# Patient Record
Sex: FEMALE | Race: WHITE | NOT HISPANIC OR LATINO | Employment: FULL TIME | URBAN - METROPOLITAN AREA
[De-identification: names, ages, dates, MRNs, and addresses within clinical notes are randomized per-mention and may not be internally consistent; named-entity substitution may affect disease eponyms.]

---

## 2017-02-07 ENCOUNTER — HOSPITAL ENCOUNTER (OUTPATIENT)
Dept: CARDIOLOGY | Facility: HOSPITAL | Age: 47
Discharge: HOME | End: 2017-02-07
Admitting: NURSE PRACTITIONER

## 2017-02-07 ENCOUNTER — OFFICE VISIT (OUTPATIENT)
Dept: CARDIOLOGY | Facility: HOSPITAL | Age: 47
End: 2017-02-07

## 2017-02-07 ENCOUNTER — PROCEDURE VISIT (OUTPATIENT)
Dept: CARDIOLOGY | Facility: HOSPITAL | Age: 47
End: 2017-02-07

## 2017-02-07 ENCOUNTER — APPOINTMENT (OUTPATIENT)
Dept: CARDIOLOGY | Facility: HOSPITAL | Age: 47
End: 2017-02-07

## 2017-02-07 VITALS
HEART RATE: 125 BPM | OXYGEN SATURATION: 99 % | RESPIRATION RATE: 20 BRPM | TEMPERATURE: 97.6 F | DIASTOLIC BLOOD PRESSURE: 71 MMHG | SYSTOLIC BLOOD PRESSURE: 145 MMHG | BODY MASS INDEX: 34.07 KG/M2 | HEIGHT: 70 IN | WEIGHT: 238 LBS

## 2017-02-07 VITALS
SYSTOLIC BLOOD PRESSURE: 133 MMHG | DIASTOLIC BLOOD PRESSURE: 94 MMHG | WEIGHT: 238 LBS | BODY MASS INDEX: 34.07 KG/M2 | HEIGHT: 70 IN

## 2017-02-07 DIAGNOSIS — E04.9 ENLARGEMENT OF THYROID: ICD-10-CM

## 2017-02-07 DIAGNOSIS — R06.09 DYSPNEA ON EXERTION: ICD-10-CM

## 2017-02-07 DIAGNOSIS — E03.9 HYPOTHYROIDISM, UNSPECIFIED TYPE: ICD-10-CM

## 2017-02-07 DIAGNOSIS — I49.3 PVC (PREMATURE VENTRICULAR CONTRACTION): ICD-10-CM

## 2017-02-07 DIAGNOSIS — R00.2 PALPITATIONS: ICD-10-CM

## 2017-02-07 DIAGNOSIS — R00.2 PALPITATIONS: Primary | ICD-10-CM

## 2017-02-07 DIAGNOSIS — R07.89 CHEST TIGHTNESS OR PRESSURE: ICD-10-CM

## 2017-02-07 DIAGNOSIS — R00.0 TACHYCARDIA: ICD-10-CM

## 2017-02-07 LAB
ALBUMIN SERPL-MCNC: 4.6 G/DL (ref 3.2–4.8)
ALBUMIN/GLOB SERPL: 1.4 G/DL (ref 1.5–2.5)
ALP SERPL-CCNC: 97 U/L (ref 25–100)
ALT SERPL W P-5'-P-CCNC: 28 U/L (ref 7–40)
ANION GAP SERPL CALCULATED.3IONS-SCNC: 7 MMOL/L (ref 3–11)
AST SERPL-CCNC: 24 U/L (ref 0–33)
BH CV ECHO MEAS - AO ROOT AREA (BSA CORRECTED): 1.2
BH CV ECHO MEAS - AO ROOT AREA: 5.4 CM^2
BH CV ECHO MEAS - AO ROOT DIAM: 2.6 CM
BH CV ECHO MEAS - BSA(HAYCOCK): 2.3 M^2
BH CV ECHO MEAS - BSA: 2.2 M^2
BH CV ECHO MEAS - BZI_BMI: 34.1 KILOGRAMS/M^2
BH CV ECHO MEAS - BZI_METRIC_HEIGHT: 177.8 CM
BH CV ECHO MEAS - BZI_METRIC_WEIGHT: 108 KG
BH CV ECHO MEAS - CONTRAST EF (2CH): 54.8 ML/M^2
BH CV ECHO MEAS - CONTRAST EF 4CH: 53.4 ML/M^2
BH CV ECHO MEAS - EDV(CUBED): 115.7 ML
BH CV ECHO MEAS - EDV(MOD-SP2): 42 ML
BH CV ECHO MEAS - EDV(MOD-SP4): 58 ML
BH CV ECHO MEAS - EDV(TEICH): 111.4 ML
BH CV ECHO MEAS - EF(CUBED): 74.1 %
BH CV ECHO MEAS - EF(MOD-SP2): 54.8 %
BH CV ECHO MEAS - EF(TEICH): 65.8 %
BH CV ECHO MEAS - ESV(CUBED): 30 ML
BH CV ECHO MEAS - ESV(MOD-SP2): 19 ML
BH CV ECHO MEAS - ESV(MOD-SP4): 27 ML
BH CV ECHO MEAS - ESV(TEICH): 38.1 ML
BH CV ECHO MEAS - FS: 36.2 %
BH CV ECHO MEAS - IVS/LVPW: 1.2
BH CV ECHO MEAS - IVSD: 1.1 CM
BH CV ECHO MEAS - LA DIMENSION: 3.4 CM
BH CV ECHO MEAS - LA/AO: 1.3
BH CV ECHO MEAS - LAT PEAK E' VEL: 8.8 CM/SEC
BH CV ECHO MEAS - LV DIASTOLIC VOL/BSA (35-75): 25.8 ML/M^2
BH CV ECHO MEAS - LV MASS(C)D: 167.3 GRAMS
BH CV ECHO MEAS - LV MASS(C)DI: 74.4 GRAMS/M^2
BH CV ECHO MEAS - LV SYSTOLIC VOL/BSA (12-30): 12 ML/M^2
BH CV ECHO MEAS - LVIDD: 4.9 CM
BH CV ECHO MEAS - LVIDS: 3.1 CM
BH CV ECHO MEAS - LVLD AP2: 6.4 CM
BH CV ECHO MEAS - LVLD AP4: 6.7 CM
BH CV ECHO MEAS - LVLS AP2: 5.1 CM
BH CV ECHO MEAS - LVLS AP4: 5 CM
BH CV ECHO MEAS - LVPWD: 0.89 CM
BH CV ECHO MEAS - MED PEAK E' VEL: 9.09 CM/SEC
BH CV ECHO MEAS - MV A MAX VEL: 101.2 CM/SEC
BH CV ECHO MEAS - MV E MAX VEL: 83.9 CM/SEC
BH CV ECHO MEAS - MV E/A: 0.83
BH CV ECHO MEAS - RVDD: 1.9 CM
BH CV ECHO MEAS - SI(CUBED): 38.1 ML/M^2
BH CV ECHO MEAS - SI(MOD-SP2): 10.2 ML/M^2
BH CV ECHO MEAS - SI(MOD-SP4): 13.8 ML/M^2
BH CV ECHO MEAS - SI(TEICH): 32.6 ML/M^2
BH CV ECHO MEAS - SV(CUBED): 85.7 ML
BH CV ECHO MEAS - SV(MOD-SP2): 23 ML
BH CV ECHO MEAS - SV(MOD-SP4): 31 ML
BH CV ECHO MEAS - SV(TEICH): 73.2 ML
BH CV ECHO MEAS - TAPSE (>1.6): 2.4 CM2
BH CV XLRA - RV BASE: 3.7 CM
BH CV XLRA - RV LENGTH: 6 CM
BH CV XLRA - RV MID: 3.1 CM
BH CV XLRA - TDI S': 20.8 CM/SEC
BILIRUB SERPL-MCNC: 0.5 MG/DL (ref 0.3–1.2)
BNP SERPL-MCNC: 6 PG/ML (ref 0–100)
BUN BLD-MCNC: 16 MG/DL (ref 9–23)
BUN/CREAT SERPL: 20 (ref 7–25)
CALCIUM SPEC-SCNC: 10.2 MG/DL (ref 8.7–10.4)
CHLORIDE SERPL-SCNC: 102 MMOL/L (ref 99–109)
CO2 SERPL-SCNC: 31 MMOL/L (ref 20–31)
CREAT BLD-MCNC: 0.8 MG/DL (ref 0.6–1.3)
D DIMER PPP FEU-MCNC: <0.19 MG/L (FEU) (ref 0–0.5)
DEPRECATED RDW RBC AUTO: 41.9 FL (ref 37–54)
ERYTHROCYTE [DISTWIDTH] IN BLOOD BY AUTOMATED COUNT: 12.6 % (ref 11.3–14.5)
GFR SERPL CREATININE-BSD FRML MDRD: 77 ML/MIN/1.73
GLOBULIN UR ELPH-MCNC: 3.4 GM/DL
GLUCOSE BLD-MCNC: 102 MG/DL (ref 70–100)
HCT VFR BLD AUTO: 38.7 % (ref 34.5–44)
HGB BLD-MCNC: 13.3 G/DL (ref 11.5–15.5)
LEFT ATRIUM VOLUME INDEX: 10.2 ML/M2
LEFT ATRIUM VOLUME: 23 CM3
LV EF 2D ECHO EST: 70 %
MAGNESIUM SERPL-MCNC: 2.1 MG/DL (ref 1.3–2.7)
MCH RBC QN AUTO: 31.2 PG (ref 27–31)
MCHC RBC AUTO-ENTMCNC: 34.4 G/DL (ref 32–36)
MCV RBC AUTO: 90.8 FL (ref 80–99)
PLATELET # BLD AUTO: 264 10*3/MM3 (ref 150–450)
PMV BLD AUTO: 8.6 FL (ref 6–12)
POTASSIUM BLD-SCNC: 3.9 MMOL/L (ref 3.5–5.5)
PROT SERPL-MCNC: 8 G/DL (ref 5.7–8.2)
RBC # BLD AUTO: 4.26 10*6/MM3 (ref 3.89–5.14)
SODIUM BLD-SCNC: 140 MMOL/L (ref 132–146)
T4 FREE SERPL-MCNC: 1.23 NG/DL (ref 0.89–1.76)
TROPONIN I SERPL-MCNC: <0.006 NG/ML
TSH SERPL DL<=0.05 MIU/L-ACNC: 24.61 MIU/ML (ref 0.35–5.35)
WBC NRBC COR # BLD: 6.76 10*3/MM3 (ref 3.5–10.8)

## 2017-02-07 PROCEDURE — 93005 ELECTROCARDIOGRAM TRACING: CPT

## 2017-02-07 PROCEDURE — 93306 TTE W/DOPPLER COMPLETE: CPT | Performed by: INTERNAL MEDICINE

## 2017-02-07 PROCEDURE — 85027 COMPLETE CBC AUTOMATED: CPT | Performed by: NURSE PRACTITIONER

## 2017-02-07 PROCEDURE — 99215 OFFICE O/P EST HI 40 MIN: CPT | Performed by: NURSE PRACTITIONER

## 2017-02-07 PROCEDURE — 93010 ELECTROCARDIOGRAM REPORT: CPT | Performed by: INTERNAL MEDICINE

## 2017-02-07 PROCEDURE — 83735 ASSAY OF MAGNESIUM: CPT | Performed by: NURSE PRACTITIONER

## 2017-02-07 PROCEDURE — 80053 COMPREHEN METABOLIC PANEL: CPT | Performed by: NURSE PRACTITIONER

## 2017-02-07 PROCEDURE — 93306 TTE W/DOPPLER COMPLETE: CPT

## 2017-02-07 PROCEDURE — 84443 ASSAY THYROID STIM HORMONE: CPT | Performed by: NURSE PRACTITIONER

## 2017-02-07 PROCEDURE — 84439 ASSAY OF FREE THYROXINE: CPT | Performed by: NURSE PRACTITIONER

## 2017-02-07 PROCEDURE — 83880 ASSAY OF NATRIURETIC PEPTIDE: CPT | Performed by: NURSE PRACTITIONER

## 2017-02-07 PROCEDURE — 84484 ASSAY OF TROPONIN QUANT: CPT | Performed by: NURSE PRACTITIONER

## 2017-02-07 PROCEDURE — 85379 FIBRIN DEGRADATION QUANT: CPT | Performed by: NURSE PRACTITIONER

## 2017-02-07 PROCEDURE — 93270 REMOTE 30 DAY ECG REV/REPORT: CPT | Performed by: NURSE PRACTITIONER

## 2017-02-07 RX ORDER — LEVOTHYROXINE SODIUM 112 MCG
112 TABLET ORAL DAILY
COMMUNITY
Start: 2016-12-09 | End: 2017-02-07 | Stop reason: DRUGHIGH

## 2017-02-07 RX ORDER — LEVOTHYROXINE SODIUM 0.12 MG/1
125 TABLET ORAL DAILY
Qty: 30 TABLET | Refills: 0 | Status: SHIPPED | OUTPATIENT
Start: 2017-02-07 | End: 2017-04-18 | Stop reason: SDUPTHER

## 2017-02-07 RX ORDER — OMEPRAZOLE 40 MG/1
40 CAPSULE, DELAYED RELEASE ORAL DAILY
COMMUNITY
Start: 2016-11-08 | End: 2017-04-17 | Stop reason: ALTCHOICE

## 2017-02-07 NOTE — PROGRESS NOTES
[facility]  Heart and Valve Center      Encounter Date:02/07/2017     Renetta Saldana  [address]  [place] [address]  [phone]    1970    Leobardo Reina MD    Renetta Saldana is a 47 y.o. female.      Subjective:     Chief Complaint:  Establish Care (Palpitations and Diziness)       Palpitations    This is a new problem. The current episode started in the past 7 days. Episode frequency: with any exertion. The problem has been gradually worsening. Exacerbated by: standing, exertion. Associated symptoms include anxiety, chest pain (chest tightness with palpitations), diaphoresis, dizziness, an irregular heartbeat, malaise/fatigue, nausea, near-syncope, shortness of breath and weakness. Pertinent negatives include no coughing, fever, syncope or vomiting. She has tried bed rest (Oncologist stopped Anastrazol for Breast CA) for the symptoms. The treatment provided no relief. Risk factors include stress and smoking/tobacco exposure (former smoker). Breast CA with recent chemo and radiation.  Hypothyroidism.  Hx of PVC ablation      Patient with a history of breast cancer status post chemotherapy and radiation.  Last chemotherapy treatment June 2016.  She had 33 radiation treatments with her last October 2016.  She has been on hormone blocker treatment for cancer.  She is on anastrozole since January.  Stopped last week due to complaints of palpitations, tachycardia, dyspnea on exertion.  Patient has noted nervous sensation, heart rates as high as 145 bpm.  Palpitations are waking her up at night.  Associated shortness of breath, and chest pressure, dizziness, presyncope.  She has noted mild lower extremity edema improved with elevation.  Reports having a PVC ablation approximately 3 years ago with Dr. Domingo.  She does have a history of hypothyroidism.    Problem   Palpitations   Hypothyroidism       Past Surgical History   Procedure Laterality Date   • Gallbladder surgery     • Mastectomy  2015   •  Cardiac electrophysiology study and ablation  08/05/2013     PVC ablation, Dr. Jhoan Domingo   • Uterine fibroid surgery         No Known Allergies      Current Outpatient Prescriptions:   •  omeprazole (priLOSEC) 40 MG capsule, Take 40 mg by mouth Daily., Disp: , Rfl:   •  levothyroxine (SYNTHROID) 112 MCG tablet, Take 1 tablet by mouth Daily., Disp: 30 tablet, Rfl: 0    The following portions of the patient's history were reviewed and updated as appropriate: allergies, current medications, past family history, past medical history, past social history, past surgical history and problem list.    Review of Systems   Constitution: Positive for chills, diaphoresis, weakness, malaise/fatigue, night sweats and weight gain (12 lbs ). Negative for decreased appetite, fever and weight loss.   HENT: Positive for congestion and headaches. Negative for nosebleeds.    Eyes: Negative for blurred vision, visual disturbance and visual halos.   Cardiovascular: Positive for chest pain (chest tightness with palpitations), dyspnea on exertion (with palpitations), irregular heartbeat, leg swelling (mild), near-syncope, orthopnea and palpitations. Negative for claudication, cyanosis, paroxysmal nocturnal dyspnea and syncope.   Respiratory: Positive for shortness of breath and snoring. Negative for cough, hemoptysis, sleep disturbances due to breathing, sputum production and wheezing.    Endocrine: Positive for polydipsia. Negative for cold intolerance, heat intolerance, polyphagia and polyuria.   Hematologic/Lymphatic: Bruises/bleeds easily.   Skin: Positive for dry skin. Negative for itching and rash.   Musculoskeletal: Positive for joint pain. Negative for falls, joint swelling, muscle weakness and myalgias.   Gastrointestinal: Positive for bloating, abdominal pain, constipation, heartburn and nausea. Negative for diarrhea, dysphagia, melena and vomiting.   Genitourinary: Negative for dysuria, flank pain, hematuria and nocturia.  "  Neurological: Positive for dizziness, light-headedness and loss of balance. Negative for difficulty with concentration and excessive daytime sleepiness.   Psychiatric/Behavioral: Positive for depression. Negative for altered mental status. The patient is nervous/anxious.    Allergic/Immunologic: Positive for environmental allergies.       Objective:     Vitals:    02/07/17 1313 02/07/17 1314   BP: 142/75 145/71   BP Location: Left arm Left arm   Patient Position: Sitting Standing   Cuff Size: Adult    Pulse: 102 (!) 125   Resp: 20    Temp: 97.6 °F (36.4 °C)    TempSrc: Temporal Artery     SpO2: 99%    Weight: 238 lb (108 kg)    Height: 70\" (177.8 cm)          Physical Exam   Constitutional: She is oriented to person, place, and time. She appears well-developed and well-nourished. No distress.   HENT:   Head: Normocephalic and atraumatic.   Mouth/Throat: Oropharynx is clear and moist.   Eyes: Conjunctivae are normal. Pupils are equal, round, and reactive to light. No scleral icterus.   Neck: No hepatojugular reflux and no JVD present. Carotid bruit is not present. No tracheal deviation present. Thyromegaly (right enlargement noted.) present.   Cardiovascular: Normal rate, regular rhythm, normal heart sounds and intact distal pulses.  Exam reveals no friction rub.    No murmur heard.  Pulmonary/Chest: Effort normal and breath sounds normal.   Abdominal: Soft. Bowel sounds are normal. She exhibits no distension. There is no tenderness.   Musculoskeletal: She exhibits no edema.   Lymphadenopathy:     She has no cervical adenopathy.   Neurological: She is alert and oriented to person, place, and time.   Skin: Skin is warm, dry and intact. No rash noted. No cyanosis or erythema. No pallor.   Psychiatric: She has a normal mood and affect. Her behavior is normal. Thought content normal.   Vitals reviewed.      Lab and Diagnostic Review:  Results for orders placed or performed in visit on 02/07/17   Comprehensive Metabolic " Panel   Result Value Ref Range    Glucose 102 (H) 70 - 100 mg/dL    BUN 16 9 - 23 mg/dL    Creatinine 0.80 0.60 - 1.30 mg/dL    Sodium 140 132 - 146 mmol/L    Potassium 3.9 3.5 - 5.5 mmol/L    Chloride 102 99 - 109 mmol/L    CO2 31.0 20.0 - 31.0 mmol/L    Calcium 10.2 8.7 - 10.4 mg/dL    Total Protein 8.0 5.7 - 8.2 g/dL    Albumin 4.60 3.20 - 4.80 g/dL    ALT (SGPT) 28 7 - 40 U/L    AST (SGOT) 24 0 - 33 U/L    Alkaline Phosphatase 97 25 - 100 U/L    Total Bilirubin 0.5 0.3 - 1.2 mg/dL    eGFR Non African Amer 77 >60 mL/min/1.73    Globulin 3.4 gm/dL    A/G Ratio 1.4 (L) 1.5 - 2.5 g/dL    BUN/Creatinine Ratio 20.0 7.0 - 25.0    Anion Gap 7.0 3.0 - 11.0 mmol/L   CBC (No Diff)   Result Value Ref Range    WBC 6.76 3.50 - 10.80 10*3/mm3    RBC 4.26 3.89 - 5.14 10*6/mm3    Hemoglobin 13.3 11.5 - 15.5 g/dL    Hematocrit 38.7 34.5 - 44.0 %    MCV 90.8 80.0 - 99.0 fL    MCH 31.2 (H) 27.0 - 31.0 pg    MCHC 34.4 32.0 - 36.0 g/dL    RDW 12.6 11.3 - 14.5 %    RDW-SD 41.9 37.0 - 54.0 fl    MPV 8.6 6.0 - 12.0 fL    Platelets 264 150 - 450 10*3/mm3   TSH   Result Value Ref Range    TSH 24.615 (H) 0.350 - 5.350 mIU/mL   T4, Free   Result Value Ref Range    Free T4 1.23 0.89 - 1.76 ng/dL   Magnesium   Result Value Ref Range    Magnesium 2.1 1.3 - 2.7 mg/dL   BNP   Result Value Ref Range    BNP 6.0 0.0 - 100.0 pg/mL   D-dimer, Quantitative   Result Value Ref Range    D-Dimer, Quantitative <0.19 0.00 - 0.50 mg/L (FEU)   Troponin   Result Value Ref Range    Troponin I <0.006 <=0.040 ng/mL      Assessment and Plan:         1. Palpitations  Hx of PVC ablation.    - ECG 12 Lead; NSR 99 bpm    - Comprehensive Metabolic Panel (stable)  - Magnesium (normal)  - Adult Transthoracic Echo Complete; to be completed today  - Cardiac Event Monitor; 30 day.  Dr. Domingo to read)  (Discussed placing 48 hour holter, Not available today and pt would not be able to come back this week. Due to symptoms opted for 30 day event)    2. Tachycardia  May need  BB for symptoms management.    3. Dyspnea on exertion    - CBC (No Diff) (stable)  - BNP (normal)  - D-dimer, Quantitative (normal)      4. Chest tightness or pressure    - Troponin (normal)    5. Hypothyroidism, unspecified type    Elevated TSH, normal T4  Thyroid enlargement noted.  - increase levothyroxine (SYNTHROID) 125 MCG tablet; Take 1 tablet by mouth Daily.  Dispense: 30 tablet; Refill: 0    F/u with PCP this week for possible Thyroid US and or endocrinology referral.    F/u pending results.     *Please note that portions of this note were completed with a voice recognition program. Efforts were made to edit the dictations, but occasionally words are mistranscribed.

## 2017-02-21 DIAGNOSIS — R00.2 PALPITATIONS: Primary | ICD-10-CM

## 2017-02-21 DIAGNOSIS — R00.0 SINUS TACHYCARDIA: ICD-10-CM

## 2017-02-21 RX ORDER — METOPROLOL SUCCINATE 25 MG/1
25 TABLET, EXTENDED RELEASE ORAL DAILY
Qty: 30 TABLET | Refills: 3 | Status: SHIPPED | OUTPATIENT
Start: 2017-02-21 | End: 2017-04-17 | Stop reason: SDUPTHER

## 2017-03-09 DIAGNOSIS — E03.9 HYPOTHYROIDISM, UNSPECIFIED TYPE: ICD-10-CM

## 2017-03-10 RX ORDER — LEVOTHYROXINE SODIUM 125 MCG
TABLET ORAL
Qty: 30 TABLET | Refills: 0 | OUTPATIENT
Start: 2017-03-10

## 2017-04-17 ENCOUNTER — LAB (OUTPATIENT)
Dept: LAB | Facility: HOSPITAL | Age: 47
End: 2017-04-17

## 2017-04-17 ENCOUNTER — HOSPITAL ENCOUNTER (OUTPATIENT)
Dept: GENERAL RADIOLOGY | Facility: HOSPITAL | Age: 47
Discharge: HOME | End: 2017-04-17
Admitting: NURSE PRACTITIONER

## 2017-04-17 ENCOUNTER — OFFICE VISIT (OUTPATIENT)
Dept: CARDIOLOGY | Facility: HOSPITAL | Age: 47
End: 2017-04-17

## 2017-04-17 VITALS
RESPIRATION RATE: 18 BRPM | HEART RATE: 122 BPM | OXYGEN SATURATION: 97 % | WEIGHT: 250.6 LBS | BODY MASS INDEX: 35.88 KG/M2 | HEIGHT: 70 IN | TEMPERATURE: 98.3 F | DIASTOLIC BLOOD PRESSURE: 76 MMHG | SYSTOLIC BLOOD PRESSURE: 144 MMHG

## 2017-04-17 DIAGNOSIS — E03.9 HYPOTHYROIDISM, UNSPECIFIED TYPE: Primary | ICD-10-CM

## 2017-04-17 DIAGNOSIS — E04.9 ENLARGEMENT OF THYROID: ICD-10-CM

## 2017-04-17 DIAGNOSIS — E03.9 HYPOTHYROIDISM, UNSPECIFIED TYPE: ICD-10-CM

## 2017-04-17 DIAGNOSIS — R00.2 PALPITATIONS: ICD-10-CM

## 2017-04-17 DIAGNOSIS — R00.0 SINUS TACHYCARDIA: ICD-10-CM

## 2017-04-17 DIAGNOSIS — I49.3 PVC (PREMATURE VENTRICULAR CONTRACTION): ICD-10-CM

## 2017-04-17 DIAGNOSIS — R06.09 DYSPNEA ON EXERTION: ICD-10-CM

## 2017-04-17 LAB
DEPRECATED FTI SERPL-MCNC: 2.1 TBI
T3RU NFR SERPL: 39.8 % (ref 23–37)
T4 SERPL-MCNC: 5.2 MCG/DL (ref 4.7–11.4)
TSH SERPL DL<=0.05 MIU/L-ACNC: 0.98 MIU/ML (ref 0.35–5.35)

## 2017-04-17 PROCEDURE — 71020 HC CHEST PA AND LATERAL: CPT

## 2017-04-17 PROCEDURE — 84436 ASSAY OF TOTAL THYROXINE: CPT

## 2017-04-17 PROCEDURE — 84443 ASSAY THYROID STIM HORMONE: CPT

## 2017-04-17 PROCEDURE — 99213 OFFICE O/P EST LOW 20 MIN: CPT | Performed by: NURSE PRACTITIONER

## 2017-04-17 PROCEDURE — 84479 ASSAY OF THYROID (T3 OR T4): CPT

## 2017-04-17 RX ORDER — MELATONIN
5000 DAILY
COMMUNITY

## 2017-04-17 RX ORDER — LANSOPRAZOLE 30 MG/1
60 CAPSULE, DELAYED RELEASE ORAL DAILY
COMMUNITY

## 2017-04-17 RX ORDER — METOPROLOL SUCCINATE 25 MG/1
25 TABLET, EXTENDED RELEASE ORAL 2 TIMES DAILY
Qty: 60 TABLET | Refills: 3 | Status: SHIPPED | OUTPATIENT
Start: 2017-04-17 | End: 2017-04-17 | Stop reason: SDUPTHER

## 2017-04-17 RX ORDER — METOPROLOL SUCCINATE 25 MG/1
25 TABLET, EXTENDED RELEASE ORAL 2 TIMES DAILY
Qty: 180 TABLET | Refills: 3 | Status: SHIPPED | OUTPATIENT
Start: 2017-04-17

## 2017-04-17 NOTE — PROGRESS NOTES
[facility]  Heart and Valve Center      Encounter Date:04/17/2017     Renetta Saldana  [address] [place] [address]  [phone]    1970    Leobardo Reina MD    Renetta Saldana is a 47 y.o. female.      Subjective:     Chief Complaint:  Follow-up (palpitations, dyspnea)       HPI     Patient was last seen in the heart and valve Center on 2/7/2017 with complaints of palpitations.  Worse with exertion and changing positions.  Associated symptoms included anxiety, dizziness, diaphoresis, fatigue.  Patient was placed on beta blocker, TSH was found to be elevated with adjustment of Synthroid, echocardiogram was completed.  With normal EF and no significant valvular abnormalities.  She was placed on a cardiac event monitor which showed sinus rhythm and sinus tachycardia.  Symptoms that were in the setting of breast cancer treatment.  Patient presents today for follow-up.    Denies chest pain, pressure.  Palpitations have continued but improved.  Dyspnea is still noted with exertion.  Patient reports a 10-12 pound weight gain.  She has not had her thyroid studies repeated.  States she was ordered a chest x-ray by her primary care provider which has not been completed.  She denies syncope, dizziness, edema.  She is in the process of finding a new oncologist due to insurance change.  She complains of extremely dry skin, weight gain as mentioned above, severe fatigue.    Patient Active Problem List    Diagnosis   • Palpitations [R00.2]   • Enlargement of thyroid [E04.9]   • PVC (premature ventricular contraction) [I49.3]     Overview Note:     · Echocardiogram 7/29/2009: Normal valve structures, EF 60-65%  · Normal stress test 8/12/2009, thallium GXT.  No ischemia, inferior lateral ST depression during stress noted  · History of not being able to tolerate beta blocker due to fatigue  · PVC ablation 8/5/2013, Jhoan Domingo     • Hypothyroidism [E03.9]         Past Surgical History:   Procedure Laterality  Date   • CARDIAC ELECTROPHYSIOLOGY STUDY AND ABLATION  08/05/2013    PVC ablation, Dr. Jhoan Domingo   • GALLBLADDER SURGERY     • MASTECTOMY  2015   • UTERINE FIBROID SURGERY         No Known Allergies      Current Outpatient Prescriptions:   •  cholecalciferol (VITAMIN D3) 1000 UNITS tablet, Take 2,000 Units by mouth Every Other Day., Disp: , Rfl:   •  lansoprazole (PREVACID) 30 MG capsule, Take 30 mg by mouth Daily., Disp: , Rfl:   •  levothyroxine (SYNTHROID) 125 MCG tablet, Take 1 tablet by mouth Daily., Disp: 30 tablet, Rfl: 0  •  metoprolol succinate XL (TOPROL-XL) 25 MG 24 hr tablet, Take 1 tablet by mouth 2 (Two) Times a Day., Disp: 180 tablet, Rfl: 3    The following portions of the patient's history were reviewed and updated as appropriate: allergies, current medications, past family history, past medical history, past social history, past surgical history and problem list.    Review of Systems   Constitution: Positive for weakness, malaise/fatigue, night sweats and weight gain. Negative for chills, decreased appetite, diaphoresis, fever and weight loss.   HENT: Positive for headaches. Negative for congestion and nosebleeds.    Eyes: Negative.  Negative for blurred vision, visual disturbance and visual halos.   Cardiovascular: Positive for dyspnea on exertion, irregular heartbeat, leg swelling and palpitations. Negative for chest pain, claudication, cyanosis, near-syncope, orthopnea, paroxysmal nocturnal dyspnea and syncope.   Respiratory: Positive for snoring. Negative for cough, hemoptysis, shortness of breath, sleep disturbances due to breathing, sputum production and wheezing.    Endocrine: Negative for cold intolerance, heat intolerance, polydipsia, polyphagia and polyuria.   Hematologic/Lymphatic: Bruises/bleeds easily.   Skin: Positive for dry skin. Negative for itching and rash.   Musculoskeletal: Positive for joint pain, joint swelling and myalgias. Negative for falls and muscle weakness.  "  Gastrointestinal: Positive for bloating, abdominal pain, diarrhea and heartburn. Negative for constipation, dysphagia, melena, nausea and vomiting.   Genitourinary: Positive for nocturia. Negative for dysuria, flank pain and hematuria.   Neurological: Positive for difficulty with concentration and excessive daytime sleepiness. Negative for dizziness and loss of balance.   Psychiatric/Behavioral: Positive for depression. Negative for altered mental status. The patient is not nervous/anxious.    Allergic/Immunologic: Positive for environmental allergies.       Objective:     Vitals:    04/17/17 1420 04/17/17 1421   BP: 141/86 144/76   BP Location: Left arm Left arm   Patient Position: Sitting Standing   Pulse: 107 (!) 122   Resp: 18    Temp: 98.3 °F (36.8 °C)    TempSrc: Temporal Artery     SpO2: 97%    Weight: 250 lb 9.6 oz (114 kg)    Height: 70\" (177.8 cm)          Physical Exam   Constitutional: She is oriented to person, place, and time. She appears well-developed and well-nourished. No distress.   HENT:   Head: Normocephalic and atraumatic.   Mouth/Throat: Oropharynx is clear and moist.   Eyes: Conjunctivae are normal. Pupils are equal, round, and reactive to light. No scleral icterus.   Neck: No hepatojugular reflux and no JVD present. Carotid bruit is not present. No tracheal deviation present. No thyromegaly present.   Cardiovascular: Regular rhythm, normal heart sounds and intact distal pulses.  Tachycardia present.  Exam reveals no friction rub.    No murmur heard.  Pulmonary/Chest: Effort normal and breath sounds normal.   Abdominal: Soft. Bowel sounds are normal. She exhibits no distension. There is no tenderness.   Musculoskeletal: She exhibits no edema.   Lymphadenopathy:     She has no cervical adenopathy.   Neurological: She is alert and oriented to person, place, and time.   Skin: Skin is warm, dry and intact. No rash noted. No cyanosis or erythema. No pallor.   Psychiatric: She has a normal mood " and affect. Her behavior is normal. Thought content normal.   Vitals reviewed.      Lab and Diagnostic Review:  Results for orders placed or performed in visit on 02/07/17   Comprehensive Metabolic Panel   Result Value Ref Range     Glucose 102 (H) 70 - 100 mg/dL     BUN 16 9 - 23 mg/dL     Creatinine 0.80 0.60 - 1.30 mg/dL     Sodium 140 132 - 146 mmol/L     Potassium 3.9 3.5 - 5.5 mmol/L     Chloride 102 99 - 109 mmol/L     CO2 31.0 20.0 - 31.0 mmol/L     Calcium 10.2 8.7 - 10.4 mg/dL     Total Protein 8.0 5.7 - 8.2 g/dL     Albumin 4.60 3.20 - 4.80 g/dL     ALT (SGPT) 28 7 - 40 U/L     AST (SGOT) 24 0 - 33 U/L     Alkaline Phosphatase 97 25 - 100 U/L     Total Bilirubin 0.5 0.3 - 1.2 mg/dL     eGFR Non African Amer 77 >60 mL/min/1.73     Globulin 3.4 gm/dL     A/G Ratio 1.4 (L) 1.5 - 2.5 g/dL     BUN/Creatinine Ratio 20.0 7.0 - 25.0     Anion Gap 7.0 3.0 - 11.0 mmol/L   CBC (No Diff)   Result Value Ref Range     WBC 6.76 3.50 - 10.80 10*3/mm3     RBC 4.26 3.89 - 5.14 10*6/mm3     Hemoglobin 13.3 11.5 - 15.5 g/dL     Hematocrit 38.7 34.5 - 44.0 %     MCV 90.8 80.0 - 99.0 fL     MCH 31.2 (H) 27.0 - 31.0 pg     MCHC 34.4 32.0 - 36.0 g/dL     RDW 12.6 11.3 - 14.5 %     RDW-SD 41.9 37.0 - 54.0 fl     MPV 8.6 6.0 - 12.0 fL     Platelets 264 150 - 450 10*3/mm3   TSH   Result Value Ref Range     TSH 24.615 (H) 0.350 - 5.350 mIU/mL   T4, Free   Result Value Ref Range     Free T4 1.23 0.89 - 1.76 ng/dL   Magnesium   Result Value Ref Range     Magnesium 2.1 1.3 - 2.7 mg/dL   BNP   Result Value Ref Range     BNP 6.0 0.0 - 100.0 pg/mL   D-dimer, Quantitative   Result Value Ref Range     D-Dimer, Quantitative <0.19 0.00 - 0.50 mg/L (FEU)   Troponin   Result Value Ref Range     Troponin I <0.006 <=0.040 ng/mL     Cardiac event monitor to 717-3/8/17: Sinus tachycardia and sinus rhythm, maximal heart rate 130s    Assessment and Plan:         1. Palpitations    - metoprolol succinate XL (TOPROL-XL) 25 MG 24 hr tablet; Take 1  tablet by mouth 2 (Two) Times a Day.  Dispense: 180 tablet; Refill: 3    2. Sinus tachycardia      3. PVC (premature ventricular contraction)  No PVC noted on EVent monitor    4. Hypothyroidism, unspecified type  Last TSH 24  - Thyroid Panel With TSH; Future  - Ambulatory Referral to Endocrinology    5. Dyspnea on exertion    - XR Chest PA & Lateral    F/u 3 months.  If dyspnea continues or worsens. May consider stress testing.        *Please note that portions of this note were completed with a voice recognition program. Efforts were made to edit the dictations, but occasionally words are mistranscribed.

## 2017-04-18 DIAGNOSIS — E03.9 HYPOTHYROIDISM, UNSPECIFIED TYPE: ICD-10-CM

## 2017-04-18 RX ORDER — LEVOTHYROXINE SODIUM 112 UG/1
112 TABLET ORAL DAILY
Qty: 30 TABLET | Refills: 3 | Status: SHIPPED | OUTPATIENT
Start: 2017-04-18 | End: 2017-07-31 | Stop reason: DRUGHIGH

## 2017-04-18 NOTE — PROGRESS NOTES
Lab on 04/17/2017   Component Date Value Ref Range Status   • TSH 04/17/2017 0.984  0.350 - 5.350 mIU/mL Final   • T3 Uptake 04/17/2017 39.8* 23.0 - 37.0 % Final   • T4, Total 04/17/2017 5.20  4.70 - 11.40 mcg/dL Final   • Free Thyroxine Index 04/17/2017 2.1  TBI Final     02/07/17  TSH 0.350 - 5.350 mIU/mL 24.615 (H)     Pt has a referral to endocrinology    CXR reviewed:  NO acute cardiopulmonary process.    Pt will decrease Synthroid 112 mcg daily (previouse dose)    Pt is aware of results.

## 2017-04-19 ENCOUNTER — OFFICE VISIT (OUTPATIENT)
Dept: CARDIOLOGY | Facility: CLINIC | Age: 47
End: 2017-04-19

## 2017-04-19 DIAGNOSIS — R00.2 PALPITATIONS: ICD-10-CM

## 2017-04-19 PROCEDURE — 93272 ECG/REVIEW INTERPRET ONLY: CPT | Performed by: INTERNAL MEDICINE

## 2017-07-31 ENCOUNTER — OFFICE VISIT (OUTPATIENT)
Dept: CARDIOLOGY | Facility: HOSPITAL | Age: 47
End: 2017-07-31

## 2017-07-31 VITALS
DIASTOLIC BLOOD PRESSURE: 83 MMHG | HEART RATE: 103 BPM | TEMPERATURE: 98.2 F | HEIGHT: 70 IN | SYSTOLIC BLOOD PRESSURE: 127 MMHG | RESPIRATION RATE: 18 BRPM | WEIGHT: 251 LBS | BODY MASS INDEX: 35.93 KG/M2 | OXYGEN SATURATION: 97 %

## 2017-07-31 DIAGNOSIS — I49.3 PVC (PREMATURE VENTRICULAR CONTRACTION): ICD-10-CM

## 2017-07-31 DIAGNOSIS — R00.2 PALPITATIONS: Primary | ICD-10-CM

## 2017-07-31 DIAGNOSIS — E03.9 HYPOTHYROIDISM, UNSPECIFIED TYPE: ICD-10-CM

## 2017-07-31 DIAGNOSIS — E66.9 OBESITY (BMI 30-39.9): ICD-10-CM

## 2017-07-31 PROCEDURE — 99213 OFFICE O/P EST LOW 20 MIN: CPT | Performed by: NURSE PRACTITIONER

## 2017-07-31 RX ORDER — TAMOXIFEN CITRATE 20 MG/1
TABLET ORAL DAILY
COMMUNITY

## 2017-07-31 RX ORDER — LEVOTHYROXINE SODIUM 0.12 MG/1
125 TABLET ORAL DAILY
COMMUNITY

## 2017-07-31 NOTE — PROGRESS NOTES
[facility]  Heart and Valve Center      Encounter Date:07/31/2017     Renetta Saldana  [address] [place] [address]  [phone]    1970    Leobardo Reina MD    Renetta Saldana is a 47 y.o. female.      Subjective:     Chief Complaint:  Follow-up (palpitations)       HPI     Patient was last seen in the heart and valve Center on 2/7/2017 with complaints of palpitations.  Worse with exertion and changing positions.  Associated symptoms included anxiety, dizziness, diaphoresis, fatigue.  Patient was placed on beta blocker, TSH was found to be elevated with adjustment of Synthroid, echocardiogram was completed.  With normal EF and no significant valvular abnormalities.  She was placed on a cardiac event monitor which showed sinus rhythm and sinus tachycardia.  Symptoms that were in the setting of breast cancer treatment.  Patient presents today for follow-up.    Reports improved palpitations and dyspnea.  Denies CP, pressure, dizziness, syncope, edema.        No Known Allergies      Current Outpatient Prescriptions:   •  lansoprazole (PREVACID) 30 MG capsule, Take 30 mg by mouth Daily., Disp: , Rfl:   •  levothyroxine (SYNTHROID, LEVOTHROID) 125 MCG tablet, Take 125 mcg by mouth Daily., Disp: , Rfl:   •  metoprolol succinate XL (TOPROL-XL) 25 MG 24 hr tablet, Take 1 tablet by mouth 2 (Two) Times a Day., Disp: 180 tablet, Rfl: 3  •  tamoxifen (NOLVADEX) 20 MG chemo tablet, Take  by mouth Daily., Disp: , Rfl:   •  cholecalciferol (VITAMIN D3) 1000 UNITS tablet, Take 2,000 Units by mouth Every Other Day., Disp: , Rfl:     The following portions of the patient's history were reviewed and updated as appropriate: allergies, current medications, past family history, past medical history, past social history, past surgical history and problem list.    Review of Systems   Constitution: Positive for malaise/fatigue. Negative for chills, decreased appetite, diaphoresis, fever, weakness, night sweats, weight gain  "and weight loss.   HENT: Negative for congestion, headaches and nosebleeds.    Eyes: Negative for blurred vision, visual disturbance and visual halos.   Cardiovascular: Positive for dyspnea on exertion (improved.  mild) and palpitations (improved). Negative for chest pain, claudication, cyanosis, irregular heartbeat, leg swelling, near-syncope, orthopnea, paroxysmal nocturnal dyspnea and syncope.   Respiratory: Negative for cough, hemoptysis, shortness of breath, sleep disturbances due to breathing, snoring, sputum production and wheezing.    Endocrine: Negative for cold intolerance, heat intolerance, polydipsia, polyphagia and polyuria.   Hematologic/Lymphatic: Does not bruise/bleed easily.   Skin: Negative for dry skin, itching and rash.   Musculoskeletal: Positive for joint pain. Negative for falls, joint swelling, muscle weakness and myalgias.   Gastrointestinal: Positive for bloating and abdominal pain. Negative for constipation, diarrhea, dysphagia, heartburn, melena, nausea and vomiting.   Genitourinary: Negative for dysuria, flank pain, hematuria and nocturia.   Neurological: Negative for difficulty with concentration, excessive daytime sleepiness, dizziness and loss of balance.   Psychiatric/Behavioral: Positive for depression. Negative for altered mental status. The patient is not nervous/anxious.    Allergic/Immunologic: Negative for environmental allergies.       Objective:     Vitals:    07/31/17 1334 07/31/17 1336   BP: 131/66 127/83   BP Location: Left arm Left arm   Patient Position: Sitting Standing   Pulse: 90 103   Resp: 18    Temp: 98.2 °F (36.8 °C)    TempSrc: Temporal Artery     SpO2: 97%    Weight: 251 lb (114 kg)    Height: 70\" (177.8 cm)          Physical Exam   Constitutional: She is oriented to person, place, and time. She appears well-developed and well-nourished. No distress.   HENT:   Head: Normocephalic and atraumatic.   Mouth/Throat: Oropharynx is clear and moist.   Eyes: Conjunctivae " are normal. Pupils are equal, round, and reactive to light. No scleral icterus.   Neck: No hepatojugular reflux and no JVD present. Carotid bruit is not present. No tracheal deviation present. No thyromegaly present.   Cardiovascular: Normal rate, regular rhythm, normal heart sounds and intact distal pulses.  Exam reveals no friction rub.    No murmur heard.  Pulmonary/Chest: Effort normal and breath sounds normal.   Abdominal: Soft. Bowel sounds are normal. She exhibits no distension. There is no tenderness.   Musculoskeletal: She exhibits no edema.   Lymphadenopathy:     She has no cervical adenopathy.   Neurological: She is alert and oriented to person, place, and time.   Skin: Skin is warm, dry and intact. No rash noted. No cyanosis or erythema. No pallor.   Psychiatric: She has a normal mood and affect. Her behavior is normal. Thought content normal.   Vitals reviewed.          Assessment and Plan:         1. Palpitations  Improved on BB    2. PVC (premature ventricular contraction)      3. Hypothyroidism, unspecified type  Followed by endocrinology    4.  Obesity  Diet and activity modification for weight loss.    F/u as needed.  *Please note that portions of this note were completed with a voice recognition program. Efforts were made to edit the dictations, but occasionally words are mistranscribed.

## 2017-08-18 ENCOUNTER — TRANSCRIBE ORDERS (OUTPATIENT)
Dept: ADMINISTRATIVE | Facility: HOSPITAL | Age: 47
End: 2017-08-18

## 2017-08-18 ENCOUNTER — LAB (OUTPATIENT)
Dept: LAB | Facility: HOSPITAL | Age: 47
End: 2017-08-18
Attending: INTERNAL MEDICINE

## 2017-08-18 DIAGNOSIS — R53.82 CHRONIC FATIGUE: ICD-10-CM

## 2017-08-18 DIAGNOSIS — D64.9 ANEMIA, UNSPECIFIED: ICD-10-CM

## 2017-08-18 DIAGNOSIS — E83.52 HYPERCALCEMIA: ICD-10-CM

## 2017-08-18 DIAGNOSIS — R07.9 CHEST PAIN, UNSPECIFIED: Primary | ICD-10-CM

## 2017-08-18 DIAGNOSIS — R07.9 CHEST PAIN, UNSPECIFIED: ICD-10-CM

## 2017-08-18 LAB
ALBUMIN SERPL-MCNC: 4.5 G/DL (ref 3.5–5)
ALBUMIN/GLOB SERPL: 1.3 G/DL (ref 1.5–2.5)
ALP SERPL-CCNC: 86 U/L (ref 35–104)
ALT SERPL W P-5'-P-CCNC: 25 U/L (ref 10–36)
ANION GAP SERPL CALCULATED.3IONS-SCNC: 5.8 MMOL/L (ref 3.6–11.2)
AST SERPL-CCNC: 25 U/L (ref 10–30)
BASOPHILS # BLD AUTO: 0.03 10*3/MM3 (ref 0–0.3)
BASOPHILS NFR BLD AUTO: 0.4 % (ref 0–2)
BILIRUB SERPL-MCNC: 0.4 MG/DL (ref 0.2–1.8)
BUN BLD-MCNC: 11 MG/DL (ref 7–21)
BUN/CREAT SERPL: 13.1 (ref 7–25)
CALCIUM SPEC-SCNC: 9.6 MG/DL (ref 7.7–10)
CHLORIDE SERPL-SCNC: 107 MMOL/L (ref 99–112)
CO2 SERPL-SCNC: 27.2 MMOL/L (ref 24.3–31.9)
CREAT BLD-MCNC: 0.84 MG/DL (ref 0.43–1.29)
CRP SERPL-MCNC: 1.15 MG/DL (ref 0–0.99)
D DIMER PPP FEU-MCNC: 0.36 MCGFEU/ML (ref 0–0.5)
DEPRECATED RDW RBC AUTO: 43 FL (ref 37–54)
EOSINOPHIL # BLD AUTO: 0.2 10*3/MM3 (ref 0–0.7)
EOSINOPHIL NFR BLD AUTO: 2.8 % (ref 0–5)
ERYTHROCYTE [DISTWIDTH] IN BLOOD BY AUTOMATED COUNT: 13.5 % (ref 11.5–14.5)
ERYTHROCYTE [SEDIMENTATION RATE] IN BLOOD: 26 MM/HR (ref 0–20)
GFR SERPL CREATININE-BSD FRML MDRD: 73 ML/MIN/1.73
GLOBULIN UR ELPH-MCNC: 3.4 GM/DL
GLUCOSE BLD-MCNC: 118 MG/DL (ref 70–110)
HCT VFR BLD AUTO: 37.1 % (ref 37–47)
HGB BLD-MCNC: 12.3 G/DL (ref 12–16)
IMM GRANULOCYTES # BLD: 0.02 10*3/MM3 (ref 0–0.03)
IMM GRANULOCYTES NFR BLD: 0.3 % (ref 0–0.5)
LYMPHOCYTES # BLD AUTO: 1.79 10*3/MM3 (ref 1–3)
LYMPHOCYTES NFR BLD AUTO: 25.2 % (ref 21–51)
MCH RBC QN AUTO: 29.8 PG (ref 27–33)
MCHC RBC AUTO-ENTMCNC: 33.2 G/DL (ref 33–37)
MCV RBC AUTO: 89.8 FL (ref 80–94)
MONOCYTES # BLD AUTO: 0.69 10*3/MM3 (ref 0.1–0.9)
MONOCYTES NFR BLD AUTO: 9.7 % (ref 0–10)
NEUTROPHILS # BLD AUTO: 4.38 10*3/MM3 (ref 1.4–6.5)
NEUTROPHILS NFR BLD AUTO: 61.6 % (ref 30–70)
OSMOLALITY SERPL CALC.SUM OF ELEC: 279.9 MOSM/KG (ref 273–305)
PLATELET # BLD AUTO: 288 10*3/MM3 (ref 130–400)
PMV BLD AUTO: 9 FL (ref 6–10)
POTASSIUM BLD-SCNC: 3.9 MMOL/L (ref 3.5–5.3)
PROT SERPL-MCNC: 7.9 G/DL (ref 6–8)
RBC # BLD AUTO: 4.13 10*6/MM3 (ref 4.2–5.4)
SODIUM BLD-SCNC: 140 MMOL/L (ref 135–153)
WBC NRBC COR # BLD: 7.11 10*3/MM3 (ref 4.5–12.5)

## 2017-08-18 PROCEDURE — 85652 RBC SED RATE AUTOMATED: CPT | Performed by: INTERNAL MEDICINE

## 2017-08-18 PROCEDURE — 85379 FIBRIN DEGRADATION QUANT: CPT | Performed by: INTERNAL MEDICINE

## 2017-08-18 PROCEDURE — 85025 COMPLETE CBC W/AUTO DIFF WBC: CPT | Performed by: INTERNAL MEDICINE

## 2017-08-18 PROCEDURE — 36415 COLL VENOUS BLD VENIPUNCTURE: CPT

## 2017-08-18 PROCEDURE — 80053 COMPREHEN METABOLIC PANEL: CPT | Performed by: INTERNAL MEDICINE

## 2017-08-18 PROCEDURE — 86140 C-REACTIVE PROTEIN: CPT | Performed by: INTERNAL MEDICINE

## 2017-08-22 ENCOUNTER — TRANSCRIBE ORDERS (OUTPATIENT)
Dept: ADMINISTRATIVE | Facility: HOSPITAL | Age: 47
End: 2017-08-22

## 2017-08-22 DIAGNOSIS — Z78.0 POSTMENOPAUSAL: Primary | ICD-10-CM

## 2017-09-11 ENCOUNTER — HOSPITAL ENCOUNTER (OUTPATIENT)
Dept: BONE DENSITY | Facility: HOSPITAL | Age: 47
Discharge: HOME | End: 2017-09-11
Attending: INTERNAL MEDICINE | Admitting: INTERNAL MEDICINE

## 2017-09-11 DIAGNOSIS — Z78.0 POSTMENOPAUSAL: ICD-10-CM

## 2017-09-11 PROCEDURE — 77080 DXA BONE DENSITY AXIAL: CPT | Performed by: RADIOLOGY

## 2017-09-11 PROCEDURE — 77080 DXA BONE DENSITY AXIAL: CPT

## 2017-09-27 ENCOUNTER — TELEPHONE (OUTPATIENT)
Dept: CARDIOLOGY | Facility: HOSPITAL | Age: 47
End: 2017-09-27

## 2017-09-27 NOTE — TELEPHONE ENCOUNTER
Heidi Clay, [facility]  [place], [place]        Phone Number: [phone]                     Patient got a bill from [employer]. Claim denied, she states they told her to have porvider  resubmitte with a different dx code. Please call patient for more details.       Contacted Renetta for more details regarding letter she received from [employer].  She was told that [employer] needed a different diagnosis code from our office to submit to pt's insurance company.    I spoke with Ivan at [employer] in billing department.  I informed her of the diagnosis codes that the patient also had associated with that visit (palpitations, dyspnea on exertion, tachycardia, and dizziness).  [employer] rep requested office note and any other supporting documentation be sent to her for re-submission of this claim.    I faxed office note from 2/2/17, EKG, and Echo report 2/7/17 to Ivan at [phone].   Contacted pt to inform her that new diagnoses were sent to [employer] and they will re-submit the claim to her insurance.

## 2017-12-19 ENCOUNTER — TRANSCRIBE ORDERS (OUTPATIENT)
Dept: ADMINISTRATIVE | Facility: HOSPITAL | Age: 47
End: 2017-12-19

## 2017-12-19 DIAGNOSIS — E03.9 HYPOTHYROIDISM IN ADULT: Primary | ICD-10-CM

## 2017-12-29 ENCOUNTER — HOSPITAL ENCOUNTER (OUTPATIENT)
Dept: ULTRASOUND IMAGING | Facility: HOSPITAL | Age: 47
Discharge: HOME | End: 2017-12-29
Attending: INTERNAL MEDICINE | Admitting: INTERNAL MEDICINE

## 2017-12-29 DIAGNOSIS — E03.9 HYPOTHYROIDISM IN ADULT: ICD-10-CM

## 2017-12-29 PROCEDURE — 76536 US EXAM OF HEAD AND NECK: CPT | Performed by: RADIOLOGY

## 2017-12-29 PROCEDURE — 76536 US EXAM OF HEAD AND NECK: CPT

## 2018-01-22 ENCOUNTER — LAB (OUTPATIENT)
Dept: LAB | Facility: HOSPITAL | Age: 48
End: 2018-01-22

## 2018-01-22 ENCOUNTER — TRANSCRIBE ORDERS (OUTPATIENT)
Dept: LAB | Facility: HOSPITAL | Age: 48
End: 2018-01-22

## 2018-01-22 DIAGNOSIS — E03.9 MYXEDEMA HEART DISEASE: ICD-10-CM

## 2018-01-22 DIAGNOSIS — E03.9 MYXEDEMA HEART DISEASE: Primary | ICD-10-CM

## 2018-01-22 DIAGNOSIS — I51.9 MYXEDEMA HEART DISEASE: Primary | ICD-10-CM

## 2018-01-22 DIAGNOSIS — I51.9 MYXEDEMA HEART DISEASE: ICD-10-CM

## 2018-01-22 PROCEDURE — 84585 ASSAY OF URINE VMA: CPT

## 2018-01-22 PROCEDURE — 82384 ASSAY THREE CATECHOLAMINES: CPT

## 2018-01-22 PROCEDURE — 83835 ASSAY OF METANEPHRINES: CPT

## 2018-01-22 PROCEDURE — 81050 URINALYSIS VOLUME MEASURE: CPT

## 2018-01-22 PROCEDURE — 83497 ASSAY OF 5-HIAA: CPT

## 2018-01-25 LAB
METANEPHS 24H UR-MRATE: 69 UG/24 HR (ref 45–290)
METANEPHS UR-MCNC: 27 UG/L
NORMETANEPHRINE 24H UR-MCNC: 148 UG/L
NORMETANEPHRINE 24H UR-MRATE: 377 UG/24 HR (ref 82–500)

## 2018-01-26 LAB
5OH-INDOLEACETATE 24H UR-MCNC: 1.3 MG/L
5OH-INDOLEACETATE 24H UR-MRATE: 3.3 MG/24 HR (ref 0–14.9)
VMA 24H UR-MRATE: 3.1 MG/24 HR (ref 0–7.5)
VMA UR-MCNC: 1.2 MG/L

## 2018-01-29 LAB
DOPAMINE 24H UR-MRATE: 277 UG/24 HR (ref 0–510)
DOPAMINE UR-MCNC: 99 UG/L
EPINEPH 24H UR-MRATE: <3 UG/24 HR (ref 0–20)
EPINEPH UR-MCNC: <1 UG/L
NOREPINEPH 24H UR-MRATE: 50 UG/24 HR (ref 0–135)
NOREPINEPH UR-MCNC: 18 UG/L

## 2018-02-05 LAB — TOAL ENROLLMENT DAYS: 30

## 2018-03-27 ENCOUNTER — HOSPITAL ENCOUNTER (OUTPATIENT)
Dept: GENERAL RADIOLOGY | Facility: HOSPITAL | Age: 48
Discharge: HOME | End: 2018-03-27
Attending: INTERNAL MEDICINE | Admitting: INTERNAL MEDICINE

## 2018-03-27 ENCOUNTER — LAB (OUTPATIENT)
Dept: LAB | Facility: HOSPITAL | Age: 48
End: 2018-03-27
Attending: INTERNAL MEDICINE

## 2018-03-27 ENCOUNTER — APPOINTMENT (OUTPATIENT)
Dept: CT IMAGING | Facility: HOSPITAL | Age: 48
End: 2018-03-27

## 2018-03-27 ENCOUNTER — HOSPITAL ENCOUNTER (EMERGENCY)
Facility: HOSPITAL | Age: 48
Discharge: HOME | End: 2018-03-28
Attending: FAMILY MEDICINE | Admitting: FAMILY MEDICINE

## 2018-03-27 ENCOUNTER — TRANSCRIBE ORDERS (OUTPATIENT)
Dept: ADMINISTRATIVE | Facility: HOSPITAL | Age: 48
End: 2018-03-27

## 2018-03-27 DIAGNOSIS — R07.89 OTHER CHEST PAIN: ICD-10-CM

## 2018-03-27 DIAGNOSIS — R91.8 PULMONARY NODULES: ICD-10-CM

## 2018-03-27 DIAGNOSIS — R07.89 OTHER CHEST PAIN: Primary | ICD-10-CM

## 2018-03-27 DIAGNOSIS — C50.912 BILATERAL MALIGNANT NEOPLASM OF BREAST IN FEMALE, UNSPECIFIED ESTROGEN RECEPTOR STATUS, UNSPECIFIED SITE OF BREAST (HCC): ICD-10-CM

## 2018-03-27 DIAGNOSIS — C79.51 METASTASIS TO BONE (HCC): ICD-10-CM

## 2018-03-27 DIAGNOSIS — C50.911 BILATERAL MALIGNANT NEOPLASM OF BREAST IN FEMALE, UNSPECIFIED ESTROGEN RECEPTOR STATUS, UNSPECIFIED SITE OF BREAST (HCC): ICD-10-CM

## 2018-03-27 DIAGNOSIS — M89.9 LYTIC BONE LESIONS ON XRAY: ICD-10-CM

## 2018-03-27 LAB
ALBUMIN SERPL-MCNC: 4.1 G/DL (ref 3.5–5)
ALBUMIN/GLOB SERPL: 1.2 G/DL (ref 1.5–2.5)
ALP SERPL-CCNC: 157 U/L (ref 35–104)
ALT SERPL W P-5'-P-CCNC: 21 U/L (ref 10–36)
ANION GAP SERPL CALCULATED.3IONS-SCNC: 7.8 MMOL/L (ref 3.6–11.2)
AST SERPL-CCNC: 22 U/L (ref 10–30)
B-HCG UR QL: NEGATIVE
BASOPHILS # BLD AUTO: 0.04 10*3/MM3 (ref 0–0.3)
BASOPHILS NFR BLD AUTO: 0.5 % (ref 0–2)
BILIRUB SERPL-MCNC: 0.3 MG/DL (ref 0.2–1.8)
BUN BLD-MCNC: 16 MG/DL (ref 7–21)
BUN/CREAT SERPL: 18.8 (ref 7–25)
CALCIUM SPEC-SCNC: 9.6 MG/DL (ref 7.7–10)
CHLORIDE SERPL-SCNC: 106 MMOL/L (ref 99–112)
CO2 SERPL-SCNC: 27.2 MMOL/L (ref 24.3–31.9)
CREAT BLD-MCNC: 0.85 MG/DL (ref 0.43–1.29)
D DIMER PPP FEU-MCNC: 0.65 MCGFEU/ML (ref 0–0.5)
D DIMER PPP FEU-MCNC: 0.73 MCGFEU/ML (ref 0–0.5)
DEPRECATED RDW RBC AUTO: 42 FL (ref 37–54)
EOSINOPHIL # BLD AUTO: 0.17 10*3/MM3 (ref 0–0.7)
EOSINOPHIL NFR BLD AUTO: 1.9 % (ref 0–5)
ERYTHROCYTE [DISTWIDTH] IN BLOOD BY AUTOMATED COUNT: 13.1 % (ref 11.5–14.5)
GFR SERPL CREATININE-BSD FRML MDRD: 71 ML/MIN/1.73
GLOBULIN UR ELPH-MCNC: 3.5 GM/DL
GLUCOSE BLD-MCNC: 128 MG/DL (ref 70–110)
HCT VFR BLD AUTO: 35.2 % (ref 37–47)
HGB BLD-MCNC: 11.5 G/DL (ref 12–16)
IMM GRANULOCYTES # BLD: 0.01 10*3/MM3 (ref 0–0.03)
IMM GRANULOCYTES NFR BLD: 0.1 % (ref 0–0.5)
LIPASE SERPL-CCNC: 25 U/L (ref 13–60)
LYMPHOCYTES # BLD AUTO: 2.1 10*3/MM3 (ref 1–3)
LYMPHOCYTES NFR BLD AUTO: 23.9 % (ref 21–51)
MCH RBC QN AUTO: 29.5 PG (ref 27–33)
MCHC RBC AUTO-ENTMCNC: 32.7 G/DL (ref 33–37)
MCV RBC AUTO: 90.3 FL (ref 80–94)
MONOCYTES # BLD AUTO: 0.73 10*3/MM3 (ref 0.1–0.9)
MONOCYTES NFR BLD AUTO: 8.3 % (ref 0–10)
NEUTROPHILS # BLD AUTO: 5.72 10*3/MM3 (ref 1.4–6.5)
NEUTROPHILS NFR BLD AUTO: 65.3 % (ref 30–70)
OSMOLALITY SERPL CALC.SUM OF ELEC: 284.1 MOSM/KG (ref 273–305)
PLATELET # BLD AUTO: 296 10*3/MM3 (ref 130–400)
PMV BLD AUTO: 9 FL (ref 6–10)
POTASSIUM BLD-SCNC: 3.9 MMOL/L (ref 3.5–5.3)
PROT SERPL-MCNC: 7.6 G/DL (ref 6–8)
RBC # BLD AUTO: 3.9 10*6/MM3 (ref 4.2–5.4)
SODIUM BLD-SCNC: 141 MMOL/L (ref 135–153)
TROPONIN I SERPL-MCNC: <0.006 NG/ML
WBC NRBC COR # BLD: 8.77 10*3/MM3 (ref 4.5–12.5)

## 2018-03-27 PROCEDURE — 93005 ELECTROCARDIOGRAM TRACING: CPT | Performed by: PHYSICIAN ASSISTANT

## 2018-03-27 PROCEDURE — 85379 FIBRIN DEGRADATION QUANT: CPT | Performed by: FAMILY MEDICINE

## 2018-03-27 PROCEDURE — 84484 ASSAY OF TROPONIN QUANT: CPT | Performed by: PHYSICIAN ASSISTANT

## 2018-03-27 PROCEDURE — 71046 X-RAY EXAM CHEST 2 VIEWS: CPT

## 2018-03-27 PROCEDURE — 0 IOPAMIDOL PER 1 ML: Performed by: FAMILY MEDICINE

## 2018-03-27 PROCEDURE — 71046 X-RAY EXAM CHEST 2 VIEWS: CPT | Performed by: RADIOLOGY

## 2018-03-27 PROCEDURE — 71275 CT ANGIOGRAPHY CHEST: CPT

## 2018-03-27 PROCEDURE — 99283 EMERGENCY DEPT VISIT LOW MDM: CPT

## 2018-03-27 PROCEDURE — 80053 COMPREHEN METABOLIC PANEL: CPT | Performed by: FAMILY MEDICINE

## 2018-03-27 PROCEDURE — 81025 URINE PREGNANCY TEST: CPT | Performed by: FAMILY MEDICINE

## 2018-03-27 PROCEDURE — 85379 FIBRIN DEGRADATION QUANT: CPT

## 2018-03-27 PROCEDURE — 36415 COLL VENOUS BLD VENIPUNCTURE: CPT

## 2018-03-27 PROCEDURE — 71275 CT ANGIOGRAPHY CHEST: CPT | Performed by: RADIOLOGY

## 2018-03-27 PROCEDURE — 85025 COMPLETE CBC W/AUTO DIFF WBC: CPT | Performed by: FAMILY MEDICINE

## 2018-03-27 PROCEDURE — 83690 ASSAY OF LIPASE: CPT | Performed by: FAMILY MEDICINE

## 2018-03-27 RX ADMIN — IOPAMIDOL 100 ML: 755 INJECTION, SOLUTION INTRAVENOUS at 23:14

## 2018-03-28 VITALS
WEIGHT: 249 LBS | OXYGEN SATURATION: 98 % | TEMPERATURE: 98.3 F | SYSTOLIC BLOOD PRESSURE: 124 MMHG | BODY MASS INDEX: 35.65 KG/M2 | RESPIRATION RATE: 16 BRPM | DIASTOLIC BLOOD PRESSURE: 62 MMHG | HEIGHT: 70 IN | HEART RATE: 91 BPM

## 2018-03-28 PROCEDURE — 25010000002 MORPHINE PER 10 MG: Performed by: FAMILY MEDICINE

## 2018-03-28 PROCEDURE — 96374 THER/PROPH/DIAG INJ IV PUSH: CPT

## 2018-03-28 RX ORDER — HYDROCODONE BITARTRATE AND ACETAMINOPHEN 5; 325 MG/1; MG/1
1 TABLET ORAL EVERY 6 HOURS PRN
Qty: 12 TABLET | Refills: 0 | Status: SHIPPED | OUTPATIENT
Start: 2018-03-28

## 2018-03-28 RX ORDER — MORPHINE SULFATE 2 MG/ML
2 INJECTION, SOLUTION INTRAMUSCULAR; INTRAVENOUS ONCE
Status: COMPLETED | OUTPATIENT
Start: 2018-03-28 | End: 2018-03-28

## 2018-03-28 RX ADMIN — MORPHINE SULFATE 2 MG: 2 INJECTION, SOLUTION INTRAMUSCULAR; INTRAVENOUS at 00:53

## 2018-05-31 ENCOUNTER — TRANSCRIBE ORDERS (OUTPATIENT)
Dept: LAB | Facility: HOSPITAL | Age: 48
End: 2018-05-31

## 2018-05-31 ENCOUNTER — HOSPITAL ENCOUNTER (OUTPATIENT)
Dept: GENERAL RADIOLOGY | Facility: HOSPITAL | Age: 48
Discharge: HOME | End: 2018-05-31
Admitting: NURSE PRACTITIONER

## 2018-05-31 DIAGNOSIS — C50.411 MALIGNANT NEOPLASM OF UPPER-OUTER QUADRANT OF RIGHT FEMALE BREAST, UNSPECIFIED ESTROGEN RECEPTOR STATUS (HCC): ICD-10-CM

## 2018-05-31 DIAGNOSIS — C50.411 MALIGNANT NEOPLASM OF UPPER-OUTER QUADRANT OF RIGHT FEMALE BREAST, UNSPECIFIED ESTROGEN RECEPTOR STATUS (HCC): Primary | ICD-10-CM

## 2018-05-31 PROCEDURE — 73552 X-RAY EXAM OF FEMUR 2/>: CPT

## 2018-05-31 PROCEDURE — 73552 X-RAY EXAM OF FEMUR 2/>: CPT | Performed by: RADIOLOGY

## 2021-03-18 ENCOUNTER — BULK ORDERING (OUTPATIENT)
Dept: CASE MANAGEMENT | Facility: OTHER | Age: 51
End: 2021-03-18

## 2021-03-18 DIAGNOSIS — Z23 IMMUNIZATION DUE: ICD-10-CM
